# Patient Record
(demographics unavailable — no encounter records)

---

## 2024-10-07 NOTE — DATA REVIEWED
[de-identified] : XR C-spine AP and lateral obtained at [facility] on 10/2/2024 demonstrates expected postoperative changes from prior C1-2 posterior fusion.  There are also degenerative changes notable at C4-5 and C5-6

## 2024-10-07 NOTE — PHYSICAL EXAM
[FreeTextEntry1] : Unable to obtain via telemedicine but patient reporting moving all extremities well without any weakness

## 2024-10-07 NOTE — ASSESSMENT
[FreeTextEntry1] : Ms. Mantilla is a very pleasant 67-year-old woman who is now over 1 year status post C1-2 posterior cervical fusion on 8/21/2023 and is overall doing well.  We discussed that her x-rays demonstrate good postoperative healing.  We discussed that she may follow-up on an as-needed basis going forward.  All questions were answered.  Plan: - Follow-up as needed
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
## 2024-10-08 NOTE — COUNSELING
[Fall prevention counseling provided] : Fall prevention counseling provided [Adequate lighting] : Adequate lighting [No throw rugs] : No throw rugs [Use proper foot wear] : Use proper foot wear [Use recommended devices] : Use recommended devices [Behavioral health counseling provided] : Behavioral health counseling provided [Sleep ___ hours/day] : Sleep [unfilled] hours/day [Engage in a relaxing activity] : Engage in a relaxing activity [Yes] : Risk of tobacco use and health benefits of smoking cessation discussed: Yes [AUDIT-C Screening administered and reviewed] : AUDIT-C Screening administered and reviewed [Benefits of weight loss discussed] : Benefits of weight loss discussed [Encouraged to increase physical activity] : Encouraged to increase physical activity [____ min/wk Activity] : [unfilled] min/wk activity [None] : None [Good understanding] : Patient has a good understanding of lifestyle changes and steps needed to achieve self management goal

## 2024-10-08 NOTE — HISTORY OF PRESENT ILLNESS
[FreeTextEntry1] : First annual wellness visit and CPE [de-identified] : 68 y/o female with PMHx significant for Covid infection 2021, diabetes, Carotid stenosis s/p LEFT Endarterectomy, anxiety, HTN, HLD, Cervical fusion surgery presenting for Wellness visit and CPE. Pt states that she had poison ivy and placed on steroid taper which she finished 1.5 weeks ago.

## 2024-10-08 NOTE — PHYSICAL EXAM
[No Acute Distress] : no acute distress [Well Nourished] : well nourished [Well Developed] : well developed [Well-Appearing] : well-appearing [Normal Sclera/Conjunctiva] : normal sclera/conjunctiva [PERRL] : pupils equal round and reactive to light [EOMI] : extraocular movements intact [Normal Outer Ear/Nose] : the outer ears and nose were normal in appearance [Normal Oropharynx] : the oropharynx was normal [No JVD] : no jugular venous distention [No Lymphadenopathy] : no lymphadenopathy [Supple] : supple [Thyroid Normal, No Nodules] : the thyroid was normal and there were no nodules present [No Respiratory Distress] : no respiratory distress  [No Accessory Muscle Use] : no accessory muscle use [Clear to Auscultation] : lungs were clear to auscultation bilaterally [Normal Rate] : normal rate  [Regular Rhythm] : with a regular rhythm [Normal S1, S2] : normal S1 and S2 [No Murmur] : no murmur heard [No Carotid Bruits] : no carotid bruits [No Abdominal Bruit] : a ~M bruit was not heard ~T in the abdomen [No Varicosities] : no varicosities [Pedal Pulses Present] : the pedal pulses are present [No Edema] : there was no peripheral edema [No Palpable Aorta] : no palpable aorta [No Extremity Clubbing/Cyanosis] : no extremity clubbing/cyanosis [Soft] : abdomen soft [Non Tender] : non-tender [Non-distended] : non-distended [No Masses] : no abdominal mass palpated [No HSM] : no HSM [Normal Bowel Sounds] : normal bowel sounds [Normal Posterior Cervical Nodes] : no posterior cervical lymphadenopathy [Normal Anterior Cervical Nodes] : no anterior cervical lymphadenopathy [No CVA Tenderness] : no CVA  tenderness [No Spinal Tenderness] : no spinal tenderness [No Joint Swelling] : no joint swelling [Grossly Normal Strength/Tone] : grossly normal strength/tone [No Rash] : no rash [Coordination Grossly Intact] : coordination grossly intact [No Focal Deficits] : no focal deficits [Normal Gait] : normal gait [Deep Tendon Reflexes (DTR)] : deep tendon reflexes were 2+ and symmetric [Normal Affect] : the affect was normal [Normal Insight/Judgement] : insight and judgment were intact [de-identified] : obese

## 2024-10-08 NOTE — HEALTH RISK ASSESSMENT
[Good] : ~his/her~  mood as  good [Monthly or less (1 pt)] : Monthly or less (1 point) [1 or 2 (0 pts)] : 1 or 2 (0 points) [Never (0 pts)] : Never (0 points) [No] : In the past 12 months have you used drugs other than those required for medical reasons? No [No falls in past year] : Patient reported no falls in the past year [0] : 1) Little interest or pleasure doing things: Not at all (0) [1] : 2) Feeling down, depressed, or hopeless for several days (1) [Patient refused screening] : Patient refused screening [PHQ-2 Negative - No further assessment needed] : PHQ-2 Negative - No further assessment needed [Former] : Former [20 or more] : 20 or more [NO] : No [HIV Test offered] : HIV Test offered [Hepatitis C test offered] : Hepatitis C test offered [None] : None [With Significant Other] : lives with significant other [# of Members in Household ___] :  household currently consist of [unfilled] member(s) [Unemployed] : unemployed [Single] : single [# Of Children ___] : has [unfilled] children [Sexually Active] : sexually active [Fully functional (bathing, dressing, toileting, transferring, walking, feeding)] : Fully functional (bathing, dressing, toileting, transferring, walking, feeding) [Fully functional (using the telephone, shopping, preparing meals, housekeeping, doing laundry, using] : Fully functional and needs no help or supervision to perform IADLs (using the telephone, shopping, preparing meals, housekeeping, doing laundry, using transportation, managing medications and managing finances) [Smoke Detector] : smoke detector [Carbon Monoxide Detector] : carbon monoxide detector [Seat Belt] :  uses seat belt [Sunscreen] : uses sunscreen [With Patient/Caregiver] : , with patient/caregiver [Reviewed updated] : Reviewed, updated [Aggressive treatment] : aggressive treatment [Audit-CScore] : 1 [de-identified] : GYM [de-identified] : low carbs [[id number]] : 1 [Change in mental status noted] : No change in mental status noted [Language] : denies difficulty with language [Behavior] : denies difficulty with behavior [Learning/Retaining New Information] : denies difficulty learning/retaining new information [Handling Complex Tasks] : denies difficulty handling complex tasks [Reasoning] : denies difficulty with reasoning [Spatial Ability and Orientation] : denies difficulty with spatial ability and orientation [High Risk Behavior] : no high risk behavior [Reports changes in hearing] : Reports no changes in hearing [Reports changes in vision] : Reports no changes in vision [Reports changes in dental health] : Reports no changes in dental health [Travel to Developing Areas] : does not  travel to developing areas [MammogramDate] : 07/24 [MammogramComments] : Bi-rads 2 [PapSmearDate] : 11/22 [BoneDensityDate] : 07/24 [BoneDensityComments] : Osteopenia [AdvancecareDate] : 10/24
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
## 2024-10-09 NOTE — PHYSICAL EXAM
[de-identified] : GENERAL APPEARANCE: Well nourished and hydrated, pleasant, alert, and oriented x 3. Appears their stated age. HEENT: Normocephalic, extraocular eye motion intact. Nasal septum midline. Oral cavity clear. External auditory canal clear. RESPIRATORY: Breath sounds clear and audible in all lobes. No wheezing, No accessory muscle use. CARDIOVASCULAR: No apparent abnormalities. No lower leg edema. No varicosities. Pedal pulses are palpable. NEUROLOGIC: Sensation is normal, no muscle weakness in the upper or lower extremities. DERMATOLOGIC: No apparent skin lesions, moist, warm, no rash. SPINE: Cervical spine appears normal and moves freely; thoracic spine appears normal and moves freely; lumbosacral spine appears normal and moves freely, normal, nontender. MUSCULOSKELETAL: Hands, wrists, and elbows are normal and move freely, shoulders are normal and move freely. Psychiatric: Oriented to person, place, and time, insight and judgement were intact and the affect was normal.  [de-identified] : Musculoskeletal:. Left knee exam shows no effusion, ROM is 0-1 25 degrees, no instability, no pain with Young, medial joint line tenderness.  5/5 motor strength in bilateral lower extremities. Sensory: Intact in bilateral lower extremities. DTRs: Biceps, brachioradialis, triceps, patellar, ankle and plantar 2+ and symmetric bilaterally. Pulses: dorsalis pedis, posterior tibial, femoral, popliteal, and radial 2+ and symmetric bilaterally.  Constitutional: Alert and in no acute distress, but well-appearing.  Musculoskeletal: ambulates normally. Left hip exam showed mild groin pain with SLR, ROM is full flexion with 60 degrees of external rotation and 30 degrees of internal rotation, CLAUDIA negative, FADIR mildly positive 5/5 motor strength in bilateral lower extremities. Sensory: Intact in bilateral lower extremities. DTRs: Biceps, brachioradialis, triceps, patellar, ankle and plantar 2+ and symmetric bilaterally. Pulses: dorsalis pedis, posterior tibial, femoral, popliteal, and radial 2+ and symmetric bilaterally.   [de-identified] : 4 views of the left knee obtained the office today show no acute fracture or dislocation.  There is mild medial joint space narrowing small osteophyte formation consistent with Kellgren-Tyree grade 1 changes  AP pelvis and 2 views of the left hip obtained the office today show no acute fracture or dislocation.  There is severe joint space narrowing bone-on-bone osteoarthritis consistent with severe left hip osteoarthritis

## 2024-10-09 NOTE — HISTORY OF PRESENT ILLNESS
[de-identified] : Patient is a 67-year-old female here today for evaluation of left hip and knee pain.  Patient states that she has been having mild intermittent pains in the left hip.  Hurts with certain rotations of the hip.  States that she recently put a shoe lift in her foot and had felt better.  Does not use any assistive devices.  States that she still has pain over the directed aspect of her left knee.  Denies any recent falls or trauma

## 2024-10-09 NOTE — DISCUSSION/SUMMARY
[Medication Risks Reviewed] : Medication risks reviewed [Surgical risks reviewed] : Surgical risks reviewed [de-identified] : Patient is a 67-year-old female here today for evaluation of left hip pain as well as follow-up for left knee pain.  She does have significant osteoarthritic changes of her left hip on her x-ray however she is only mildly symptomatic at this time.  We have therefore continue with conservative treatment.  We discussed low impact activity exercise.  She take Tylenol and NSAIDs as needed for the pain.  Prescription physical therapy was provided.  I will see her back on an as-needed basis for her left hip.  All questions were asked and answered.  She was advised may be candidate for total knee arthroplasty in the future.  As for her left knee issue continue conservative treatment.  We discussed low-impact activity exercise.  We discussed physical therapy.  All questions were asked and answered

## 2024-10-09 NOTE — DISCUSSION/SUMMARY
[Medication Risks Reviewed] : Medication risks reviewed [Surgical risks reviewed] : Surgical risks reviewed [de-identified] : Patient is a 67-year-old female here today for evaluation of left hip pain as well as follow-up for left knee pain.  She does have significant osteoarthritic changes of her left hip on her x-ray however she is only mildly symptomatic at this time.  We have therefore continue with conservative treatment.  We discussed low impact activity exercise.  She take Tylenol and NSAIDs as needed for the pain.  Prescription physical therapy was provided.  I will see her back on an as-needed basis for her left hip.  All questions were asked and answered.  She was advised may be candidate for total knee arthroplasty in the future.  As for her left knee issue continue conservative treatment.  We discussed low-impact activity exercise.  We discussed physical therapy.  All questions were asked and answered

## 2024-10-09 NOTE — REASON FOR VISIT
[FreeTextEntry2] : Left hip/ left knee pain Left hip/ left knee pain [Initial Visit] : an initial visit for

## 2024-10-09 NOTE — PHYSICAL EXAM
[de-identified] : GENERAL APPEARANCE: Well nourished and hydrated, pleasant, alert, and oriented x 3. Appears their stated age. HEENT: Normocephalic, extraocular eye motion intact. Nasal septum midline. Oral cavity clear. External auditory canal clear. RESPIRATORY: Breath sounds clear and audible in all lobes. No wheezing, No accessory muscle use. CARDIOVASCULAR: No apparent abnormalities. No lower leg edema. No varicosities. Pedal pulses are palpable. NEUROLOGIC: Sensation is normal, no muscle weakness in the upper or lower extremities. DERMATOLOGIC: No apparent skin lesions, moist, warm, no rash. SPINE: Cervical spine appears normal and moves freely; thoracic spine appears normal and moves freely; lumbosacral spine appears normal and moves freely, normal, nontender. MUSCULOSKELETAL: Hands, wrists, and elbows are normal and move freely, shoulders are normal and move freely. Psychiatric: Oriented to person, place, and time, insight and judgement were intact and the affect was normal.  [de-identified] : Musculoskeletal:. Left knee exam shows no effusion, ROM is 0-1 25 degrees, no instability, no pain with Young, medial joint line tenderness.  5/5 motor strength in bilateral lower extremities. Sensory: Intact in bilateral lower extremities. DTRs: Biceps, brachioradialis, triceps, patellar, ankle and plantar 2+ and symmetric bilaterally. Pulses: dorsalis pedis, posterior tibial, femoral, popliteal, and radial 2+ and symmetric bilaterally.  Constitutional: Alert and in no acute distress, but well-appearing.  Musculoskeletal: ambulates normally. Left hip exam showed mild groin pain with SLR, ROM is full flexion with 60 degrees of external rotation and 30 degrees of internal rotation, CLAUDIA negative, FADIR mildly positive 5/5 motor strength in bilateral lower extremities. Sensory: Intact in bilateral lower extremities. DTRs: Biceps, brachioradialis, triceps, patellar, ankle and plantar 2+ and symmetric bilaterally. Pulses: dorsalis pedis, posterior tibial, femoral, popliteal, and radial 2+ and symmetric bilaterally.   [de-identified] : 4 views of the left knee obtained the office today show no acute fracture or dislocation.  There is mild medial joint space narrowing small osteophyte formation consistent with Kellgren-Tyree grade 1 changes  AP pelvis and 2 views of the left hip obtained the office today show no acute fracture or dislocation.  There is severe joint space narrowing bone-on-bone osteoarthritis consistent with severe left hip osteoarthritis

## 2024-10-09 NOTE — HISTORY OF PRESENT ILLNESS
[de-identified] : Patient is a 67-year-old female here today for evaluation of left hip and knee pain.  Patient states that she has been having mild intermittent pains in the left hip.  Hurts with certain rotations of the hip.  States that she recently put a shoe lift in her foot and had felt better.  Does not use any assistive devices.  States that she still has pain over the directed aspect of her left knee.  Denies any recent falls or trauma
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
## 2025-01-10 NOTE — CARDIOLOGY SUMMARY
[de-identified] : 1. Left ventricular ejection fraction, by visual estimation, is >75%.  2. Normal global left ventricular systolic function.  3. Spectral Doppler shows impaired relaxation pattern of left ventricular myocardial filling (Grade I diastolic dysfunction).  4. Normal right ventricular size and function.  5. The left atrium is normal in size.  6. Trace mitral valve regurgitation.  7. Mild mitral annular calcification.  8. Mild thickening of the anterior and posterior mitral valve leaflets.  9. There is no evidence of pericardial effusion. 10. Sclerotic aortic valve with normal opening. 11. There are no prior studies on this patient for comparison purposes.  MD Rod Electronically signed on 8/25/2023 at 3:57:51 PM
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
## 2025-01-17 NOTE — HEALTH RISK ASSESSMENT
[Monthly or less (1 pt)] : Monthly or less (1 point) [1 or 2 (0 pts)] : 1 or 2 (0 points) [Never (0 pts)] : Never (0 points) [No] : In the past 12 months have you used drugs other than those required for medical reasons? No [No falls in past year] : Patient reported no falls in the past year [0] : 1) Little interest or pleasure doing things: Not at all (0) [1] : 2) Feeling down, depressed, or hopeless for several days (1) [Patient refused screening] : Patient refused screening [PHQ-2 Negative - No further assessment needed] : PHQ-2 Negative - No further assessment needed [With Patient/Caregiver] : , with patient/caregiver [Reviewed updated] : Reviewed, updated [Aggressive treatment] : aggressive treatment [Former] : Former [20 or more] : 20 or more [Audit-CScore] : 1 [de-identified] : GYM [de-identified] : low carbs [[id number]] : 1 [AdvancecareDate] : 10/24

## 2025-01-17 NOTE — HISTORY OF PRESENT ILLNESS
[FreeTextEntry1] : Follow up [de-identified] : 68 y/o female with PMHx significant for Covid infection 2021, diabetes, Carotid stenosis s/p LEFT Endarterectomy, anxiety, HTN, HLD, Cervical fusion surgery presenting for follow up. Pt admits not following up with recommendations from last visit, and never had BW drawn.

## 2025-01-17 NOTE — HEALTH RISK ASSESSMENT
[Monthly or less (1 pt)] : Monthly or less (1 point) [1 or 2 (0 pts)] : 1 or 2 (0 points) [Never (0 pts)] : Never (0 points) [No] : In the past 12 months have you used drugs other than those required for medical reasons? No [No falls in past year] : Patient reported no falls in the past year [0] : 1) Little interest or pleasure doing things: Not at all (0) [1] : 2) Feeling down, depressed, or hopeless for several days (1) [Patient refused screening] : Patient refused screening [PHQ-2 Negative - No further assessment needed] : PHQ-2 Negative - No further assessment needed [With Patient/Caregiver] : , with patient/caregiver [Reviewed updated] : Reviewed, updated [Aggressive treatment] : aggressive treatment [Former] : Former [20 or more] : 20 or more [Audit-CScore] : 1 [de-identified] : GYM [de-identified] : low carbs [[id number]] : 1 [AdvancecareDate] : 10/24

## 2025-03-09 NOTE — PHYSICAL EXAM
[Chaperone Present] : A chaperone was present in the examining room during all aspects of the physical examination [Appropriately responsive] : appropriately responsive [Alert] : alert [No Acute Distress] : no acute distress [Soft] : soft [Non-tender] : non-tender [No Lesions] : no lesions [No Mass] : no mass [Oriented x3] : oriented x3 [Examination Of The Breasts] : a normal appearance [No Masses] : no breast masses were palpable [Labia Majora] : normal [Labia Minora] : normal [Normal] : normal [Uterine Adnexae] : normal

## 2025-03-09 NOTE — HISTORY OF PRESENT ILLNESS
[N] : Patient is not sexually active [Menarche Age: ____] : age at menarche was [unfilled] [Menopause Age: ____] : age at menopause was [unfilled] [FreeTextEntry1] : 67 y.o  (LMP 45) presenting for gyn annual  Referred for GYN annual by PCP.  Overall doing well.  No acute GYN concerns at this time.  Previously sexually active with male partner.  No longer sexually active. Denies menopausal like symptoms. Denies any pelvic pain symptoms, postmenopausal bleeding or urinary issues Lives with , denies any fall risks in home. Fully functional with ADLs.  Screening: - Pap (): WNL per patient - Mammogram (2024): BIRADs-2 - DEXA (): Osteopenia - Colorectal cancer screening - pending GI consult 2025  ObHx:  x 3 GynHx:  Denies hx of ovarian cysts, hx of fibroids, hx of endometriosis, hx of STD's PMH: Carotid stenosis s/p LEFT Endarterectomy, anxiety, HTN, HLD, anxiety, T2DM, asthma PSH: Cervical fusion, cataract, Endarterectomy ALL: PCN, Sulfa SocHx: Lives with family. Feels safe at home. Former smoker (20 yrs) FamHx: no significant family history of GYN malignancy or disease  [PGHxTotal] : 3 [[facility]] : 3 [PGHxPremature] : 0 [PGHxAbortions] : 0 [[facility] Gilbertiving] : 3 [PGHxABInduced] : 0 [PGHxABSpont] : 0 [PGHxEctopic] : 0 [PGHxMultBirths] : 0

## 2025-03-09 NOTE — HISTORY OF PRESENT ILLNESS
[N] : Patient is not sexually active [Menarche Age: ____] : age at menarche was [unfilled] [Menopause Age: ____] : age at menopause was [unfilled] [FreeTextEntry1] : 67 y.o  (LMP 45) presenting for gyn annual  Referred for GYN annual by PCP.  Overall doing well.  No acute GYN concerns at this time.  Previously sexually active with male partner.  No longer sexually active. Denies menopausal like symptoms. Denies any pelvic pain symptoms, postmenopausal bleeding or urinary issues Lives with , denies any fall risks in home. Fully functional with ADLs.  Screening: - Pap (): WNL per patient - Mammogram (2024): BIRADs-2 - DEXA (): Osteopenia - Colorectal cancer screening - pending GI consult 2025  ObHx:  x 3 GynHx:  Denies hx of ovarian cysts, hx of fibroids, hx of endometriosis, hx of STD's PMH: Carotid stenosis s/p LEFT Endarterectomy, anxiety, HTN, HLD, anxiety, T2DM, asthma PSH: Cervical fusion, cataract, Endarterectomy ALL: PCN, Sulfa SocHx: Lives with family. Feels safe at home. Former smoker (20 yrs) FamHx: no significant family history of GYN malignancy or disease  [PGHxTotal] : 3 [[facility] Hospit[facility]Term] : 3 [PGHxPremature] : 0 [PGHxAbortions] : 0 [[facility]iving] : 3 [PGHxABInduced] : 0 [PGHxABSpont] : 0 [PGHxEctopic] : 0 [PGHxMultBirths] : 0

## 2025-03-09 NOTE — DISCUSSION/SUMMARY
[FreeTextEntry1] : 67 y.o  (LMP 45) presenting for gyn annual    #Well Woman Visit  1. Nutrition/Activity: The benefits of physical activity and balanced diet.  2. Health Screening: She was informed of the benefits of a screening colonoscopy/DEXA/Mammo/Pap. - Cervix: We reviewed ASCCP/ACOG guidelines for pap smear screening. Hx of pap , no hx of abnormal pap, per asccp guidelines, no longer requires screening pap. Requested records from prior obgyn - Mammogram due 2024 - Discussed vitamin and calcium supplementation with weight bearing exercises to aid with bone health - GI consult 2025 for colonoscopy 3. Sex Health: The importance of safe-sex practices was discussed with the patient. STD screening was offered to patient, she declines      She verbalized understanding and agreement with above counseling regarding differential diagnosis, evaluation, and plan. She was given time for questions/concerns which were all answered to her apparent satisfaction.     RTO in 1 yr for annual
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
## 2025-04-02 NOTE — ASSESSMENT
[FreeTextEntry1] : Ms. Mantilla is a very pleasant 67-year-old woman who is now over 1 year status post C1-2 posterior cervical fusion on 8/21/2023 who presents now for increased stiffness in the neck and upper back.  She notably does not have any radiculopathy or any clumsiness in her hands or difficulty with fine motor tasks or gait instability or falls.  On exam she remains 5/5 bilateral upper and lower extremities without electric findings or hyperreflexia.  Her incision is well-healed.  He does endorse some increased sensation in her posterior C2 distribution which likely represents peripheral nerve healing and sensory reinnervation.  We discussed that given her stiffness in her neck and her upper back, she would likely benefit from a trial of physical therapy for strengthening and range of motion.  We discussed that may be helpful to obtain a x-ray of the cervical and thoracic spine AP and lateral to assess for any structural issues and reassess the fusion.  We discussed having her follow-up in 4 to 6 weeks time to discuss her overall progress and next steps if necessary.  All questions were answered.  Plan: - Referral to physical therapy - Follow-up XR C-spine and T-spine AP and lateral - Follow-up in 4 to 6 weeks

## 2025-04-02 NOTE — PHYSICAL EXAM
[FreeTextEntry1] : awake, alert interactive, appropriate RUE 5/5 D/B/T/WE/WF//IO LUE 5/5 D/B/T/WE/WF//IO RLE 5/5 HF/KE/KF/DF/PF/EHL LLE 5/5 HF/KE/KF/DF/PF/EHL SILT negative Jay's bilaterally negative clonus bilaterally narrow-based gait wnl reflexes 2+

## 2025-04-14 NOTE — HISTORY OF PRESENT ILLNESS
[FreeTextEntry1] : Follow up [de-identified] : 66 y/o female with PMHx significant for Covid infection 2021, diabetes, Carotid stenosis s/p LEFT Endarterectomy, anxiety, HTN, HLD, Cervical fusion surgery presenting for follow up. Pt admits not following up with recommendations from last visit, and never had BW drawn.

## 2025-04-14 NOTE — HEALTH RISK ASSESSMENT
[Monthly or less (1 pt)] : Monthly or less (1 point) [1 or 2 (0 pts)] : 1 or 2 (0 points) [Never (0 pts)] : Never (0 points) [No] : In the past 12 months have you used drugs other than those required for medical reasons? No [No falls in past year] : Patient reported no falls in the past year [0] : 1) Little interest or pleasure doing things: Not at all (0) [1] : 2) Feeling down, depressed, or hopeless for several days (1) [Patient refused screening] : Patient refused screening [PHQ-2 Negative - No further assessment needed] : PHQ-2 Negative - No further assessment needed [With Patient/Caregiver] : , with patient/caregiver [Reviewed updated] : Reviewed, updated [Aggressive treatment] : aggressive treatment [Former] : Former [20 or more] : 20 or more [Audit-CScore] : 1 [de-identified] : GYM [de-identified] : low carbs [[id number]] : 1 [AdvancecareDate] : 10/24
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
## 2025-05-13 NOTE — REASON FOR VISIT
[Follow-Up Visit] : a follow-up visit for [FreeTextEntry2] : S/p C1-2 posterior cervical fusion 8/21/2023

## 2025-05-13 NOTE — PHYSICAL EXAM
[de-identified] : Patient is awake and alert. Well appearing in no acute distress Patient is interactive and appropriate RUE 5/5 Deltoid/Biceps/Triceps/WE/WF//IO LUE 5/5 Deltoid/Biceps/Triceps/WE/WF//IO RLE 5/5 HF/KE/KF/DF/PF/EHL LLE 5/5 HF/KE/KF/DF/PF/EHL Sensation intact to light touch Mild tenderness of bilateral cervical paraspinals  Negative Jay's bilaterally Negative clonus bilaterally Narrow-based gait within normal limits reflexes 2+ Incision is very well healed  [de-identified] : XRay Cervical Spine and Thoracic Spine AP & Lateral, [facility], 5/7/2025: IMPRESSION: Status post fusion of the posterior segments of the C1 and C2 with satisfactory position of the hardware. Moderate hypertrophic degeneration at the level of C5-C6 is noted.  IMPRESSION: Moderate hypertrophic degeneration of the mid and lower thoracic spine present, otherwise unremarkable examination..

## 2025-05-21 NOTE — CARDIOLOGY SUMMARY
[de-identified] : Sinus Rhythm @ 61 bpm with no acute STT segment changes  Sinus Rhythm @ 68 bpm WITHIN NORMAL LIMITS  [de-identified] : 2023: 1. Left ventricular ejection fraction, by visual estimation, is >75%.  2. Normal global left ventricular systolic function.  3. Spectral Doppler shows impaired relaxation pattern of left ventricular myocardial filling (Grade I diastolic dysfunction).  4. Normal right ventricular size and function.  5. The left atrium is normal in size.  6. Trace mitral valve regurgitation.  7. Mild mitral annular calcification.  8. Mild thickening of the anterior and posterior mitral valve leaflets.  9. There is no evidence of pericardial effusion. 10. Sclerotic aortic valve with normal opening. 11. There are no prior studies on this patient for comparison purposes.  MD Rod Electronically signed on 8/25/2023 at 3:57:51 PM [de-identified] : 1. Right: CCA No significant plaque visualized throughout the CCA. Mild heterogenous plaque visualized in the carotid bulb. 2. Right Carotid System _ Overall plaque burden is mild without evidence of hemodynamically significant stenosis. 3. Left Carotid System - Status post left carotid endarterectomy. Overall plaque burden is mild with elevated velocities noted in the proximal ICA suggestive of <50% stenosis. 4. No prior exam is available for comparison.

## 2025-05-21 NOTE — HISTORY OF PRESENT ILLNESS
[FreeTextEntry1] : 68 y/o F with PMH of Covid infection 2021, diabetes, Carotid stenosis s/p LEFT Endarterectomy, [facility], follow up 2 yrs, 2018 L carotid artery, anxiety, HTN, HLD, presents for follow up   She notes that blood pressure has been elevated and notes no headhaches dizziness or lightheadedness  Has been on Losartan for prolonged period and questionable if this drug is effective anymore

## 2025-05-21 NOTE — ASSESSMENT
[FreeTextEntry1] : 68 y/o F with PMH of Covid infection 2021, diabetes, Carotid stenosis s/p LEFT Endarterectomy, [facility], follow up 2 yrs, 2018 L carotid artery, anxiety, HTN, HLD,   1) Hypertension - blood pressure uncontrolled  EKG shows NSR with no EKG changes consistent with ischemia  - TTE and EST done with previous cardiologist - will need to obtain records  - on Coreg 25mg po q 12hrs Chlorthalidone 25mg po q daily Losartan 100mg - still uncontrolled and so will switch to Valsartan - HCTZ 160-12.5mg po qdaily and continue with Norvasc 5mg po q daily Coreg 25mg po q 12hrs   - low salt diet encourage   2) ANDRZEJ s/p L carotid endarterectomy  - on ASA and Statin  - US carotid: Right: CCA No significant plaque visualized throughout the CCA. Mild heterogenous plaque visualized in the carotid bulb. Right Carotid System _ Overall plaque burden is mild without evidence of hemodynamically significant stenosis. Left Carotid System - Status post left carotid endarterectomy. Overall plaque burden is mild with elevated velocities noted in the proximal ICA suggestive of <50% stenosis.  3) Dyslipidemia - on Lipitor last LDL 65 @ goal    Tatum Thomas D.O. [facility] Cardiology/Vascular Cardiology -[facility] Cardiology Telephone # [phone]

## 2025-05-21 NOTE — CARDIOLOGY SUMMARY
[de-identified] : Sinus Rhythm @ 61 bpm with no acute STT segment changes  Sinus Rhythm @ 68 bpm WITHIN NORMAL LIMITS  [de-identified] : 2023: 1. Left ventricular ejection fraction, by visual estimation, is >75%.  2. Normal global left ventricular systolic function.  3. Spectral Doppler shows impaired relaxation pattern of left ventricular myocardial filling (Grade I diastolic dysfunction).  4. Normal right ventricular size and function.  5. The left atrium is normal in size.  6. Trace mitral valve regurgitation.  7. Mild mitral annular calcification.  8. Mild thickening of the anterior and posterior mitral valve leaflets.  9. There is no evidence of pericardial effusion. 10. Sclerotic aortic valve with normal opening. 11. There are no prior studies on this patient for comparison purposes.  MD Rod Electronically signed on 8/25/2023 at 3:57:51 PM [de-identified] : 1. Right: CCA No significant plaque visualized throughout the CCA. Mild heterogenous plaque visualized in the carotid bulb. 2. Right Carotid System _ Overall plaque burden is mild without evidence of hemodynamically significant stenosis. 3. Left Carotid System - Status post left carotid endarterectomy. Overall plaque burden is mild with elevated velocities noted in the proximal ICA suggestive of <50% stenosis. 4. No prior exam is available for comparison.

## 2025-05-21 NOTE — ASSESSMENT
[FreeTextEntry1] : 68 y/o F with PMH of Covid infection 2021, diabetes, Carotid stenosis s/p LEFT Endarterectomy, [facility], follow up 2 yrs, 2018 L carotid artery, anxiety, HTN, HLD,   1) Hypertension - blood pressure uncontrolled  EKG shows NSR with no EKG changes consistent with ischemia  - TTE and EST done with previous cardiologist - will need to obtain records  - on Coreg 25mg po q 12hrs Chlorthalidone 25mg po q daily Losartan 100mg - still uncontrolled and so will switch to Valsartan - HCTZ 160-12.5mg po qdaily and continue with Norvasc 5mg po q daily Coreg 25mg po q 12hrs   - low salt diet encourage   2) ANDRZEJ s/p L carotid endarterectomy  - on ASA and Statin  - US carotid: Right: CCA No significant plaque visualized throughout the CCA. Mild heterogenous plaque visualized in the carotid bulb. Right Carotid System _ Overall plaque burden is mild without evidence of hemodynamically significant stenosis. Left Carotid System - Status post left carotid endarterectomy. Overall plaque burden is mild with elevated velocities noted in the proximal ICA suggestive of <50% stenosis.  3) Dyslipidemia - on Lipitor last LDL 65 @ goal    Tatum Thomas D.O. [facility] Cardiology/Vascular Cardiology -[facility] Cardiology Telephone # [phone]

## 2025-05-21 NOTE — HISTORY OF PRESENT ILLNESS
[FreeTextEntry1] : 66 y/o F with PMH of Covid infection 2021, diabetes, Carotid stenosis s/p LEFT Endarterectomy, [facility], follow up 2 yrs, 2018 L carotid artery, anxiety, HTN, HLD, presents for follow up   She notes that blood pressure has been elevated and notes no headhaches dizziness or lightheadedness  Has been on Losartan for prolonged period and questionable if this drug is effective anymore

## 2025-07-07 NOTE — DATA REVIEWED
[FreeTextEntry1] : Increase Symbicort to 160-4.5 2 puffs Q12H.\par Pt aware not to take alprazolam or oxycodone when taking cough medication.\par F/U 1 month\par  [No studies available for review at this time.] : No studies available for review at this time.
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
## 2025-07-08 NOTE — PHYSICAL EXAM
[Normal] : normal gait, coordination grossly intact, no focal deficits and deep tendon reflexes were 2+ and symmetric [de-identified] : end inspiratory weezing at RIGHT base

## 2025-07-08 NOTE — HEALTH RISK ASSESSMENT
[Monthly or less (1 pt)] : Monthly or less (1 point) [1 or 2 (0 pts)] : 1 or 2 (0 points) [Never (0 pts)] : Never (0 points) [No] : In the past 12 months have you used drugs other than those required for medical reasons? No [No falls in past year] : Patient reported no falls in the past year [0] : 1) Little interest or pleasure doing things: Not at all (0) [1] : 2) Feeling down, depressed, or hopeless for several days (1) [Patient refused screening] : Patient refused screening [PHQ-2 Negative - No further assessment needed] : PHQ-2 Negative - No further assessment needed [Former] : Former [20 or more] : 20 or more [With Patient/Caregiver] : , with patient/caregiver [Reviewed updated] : Reviewed, updated [Aggressive treatment] : aggressive treatment [Audit-CScore] : 1 [de-identified] : GYM [de-identified] : low carbs [[id number]] : 1 [AdvancecareDate] : 10/24

## 2025-07-08 NOTE — REVIEW OF SYSTEMS
(M6) obeys commands [Shortness Of Breath] : no shortness of breath [Wheezing] : no wheezing [Cough] : cough [Negative] : Neurological

## 2025-07-08 NOTE — HEALTH RISK ASSESSMENT
[Monthly or less (1 pt)] : Monthly or less (1 point) [1 or 2 (0 pts)] : 1 or 2 (0 points) [Never (0 pts)] : Never (0 points) [No] : In the past 12 months have you used drugs other than those required for medical reasons? No [No falls in past year] : Patient reported no falls in the past year [0] : 1) Little interest or pleasure doing things: Not at all (0) [1] : 2) Feeling down, depressed, or hopeless for several days (1) [Patient refused screening] : Patient refused screening [PHQ-2 Negative - No further assessment needed] : PHQ-2 Negative - No further assessment needed [Former] : Former [20 or more] : 20 or more [With Patient/Caregiver] : , with patient/caregiver [Reviewed updated] : Reviewed, updated [Aggressive treatment] : aggressive treatment [Audit-CScore] : 1 [de-identified] : GYM [de-identified] : low carbs [[id number]] : 1 [AdvancecareDate] : 10/24

## 2025-07-08 NOTE — HISTORY OF PRESENT ILLNESS
[FreeTextEntry8] : 67 y/o female with PMHx significant for Covid infection 2021, diabetes, Carotid stenosis s/p LEFT Endarterectomy, anxiety, HTN, HLD, Cervical fusion surgery presenting c/o chest congestion x 2 weeks, unable to bring sputum up, not responding to Albuterol. Pt denies any fever, no sick contacts at home

## 2025-07-08 NOTE — PHYSICAL EXAM
[Normal] : normal gait, coordination grossly intact, no focal deficits and deep tendon reflexes were 2+ and symmetric [de-identified] : end inspiratory weezing at RIGHT base
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
## 2025-07-28 NOTE — HEALTH RISK ASSESSMENT
[Monthly or less (1 pt)] : Monthly or less (1 point) [1 or 2 (0 pts)] : 1 or 2 (0 points) [Never (0 pts)] : Never (0 points) [No] : In the past 12 months have you used drugs other than those required for medical reasons? No [No falls in past year] : Patient reported no falls in the past year [0] : 1) Little interest or pleasure doing things: Not at all (0) [1] : 2) Feeling down, depressed, or hopeless for several days (1) [Patient refused screening] : Patient refused screening [PHQ-2 Negative - No further assessment needed] : PHQ-2 Negative - No further assessment needed [With Patient/Caregiver] : , with patient/caregiver [Reviewed updated] : Reviewed, updated [Aggressive treatment] : aggressive treatment [Former] : Former [20 or more] : 20 or more [Yes] : Yes [Audit-CScore] : 1 [de-identified] : GYM [de-identified] : low carbs [[id number]] : 1 [AdvancecareDate] : 10/24

## 2025-07-28 NOTE — PHYSICAL EXAM
[Normal] : soft, non-tender, non-distended, no masses palpated, no HSM and normal bowel sounds [de-identified] : obese [de-identified] : Decreased ROM of LEFT hip secondary to pain more on active than passive ROM

## 2025-07-28 NOTE — HISTORY OF PRESENT ILLNESS
[FreeTextEntry1] : Prediabetes check [de-identified] : 69 y/o female with PMHx significant for Covid infection 2021, diabetes, Carotid stenosis s/p LEFT Endarterectomy, anxiety, HTN, HLD, Cervical fusion surgery presenting for pre-diabetes check. Pt offers no new complains